# Patient Record
Sex: FEMALE | Race: WHITE | NOT HISPANIC OR LATINO | Employment: FULL TIME | URBAN - METROPOLITAN AREA
[De-identification: names, ages, dates, MRNs, and addresses within clinical notes are randomized per-mention and may not be internally consistent; named-entity substitution may affect disease eponyms.]

---

## 2021-07-23 ENCOUNTER — LAB REQUISITION (OUTPATIENT)
Dept: LAB | Facility: CLINIC | Age: 46
End: 2021-07-23
Payer: COMMERCIAL

## 2021-07-23 DIAGNOSIS — Z11.59 ENCOUNTER FOR SCREENING FOR OTHER VIRAL DISEASES: ICD-10-CM

## 2021-07-23 LAB — SARS-COV-2 RNA RESP QL NAA+PROBE: NEGATIVE

## 2021-07-23 PROCEDURE — U0005 INFEC AGEN DETEC AMPLI PROBE: HCPCS | Mod: ORL | Performed by: PHYSICIAN ASSISTANT

## 2021-08-16 ENCOUNTER — LAB REQUISITION (OUTPATIENT)
Dept: LAB | Facility: CLINIC | Age: 46
End: 2021-08-16
Payer: COMMERCIAL

## 2021-08-16 DIAGNOSIS — Z11.59 ENCOUNTER FOR SCREENING FOR OTHER VIRAL DISEASES: ICD-10-CM

## 2021-08-16 PROCEDURE — U0003 INFECTIOUS AGENT DETECTION BY NUCLEIC ACID (DNA OR RNA); SEVERE ACUTE RESPIRATORY SYNDROME CORONAVIRUS 2 (SARS-COV-2) (CORONAVIRUS DISEASE [COVID-19]), AMPLIFIED PROBE TECHNIQUE, MAKING USE OF HIGH THROUGHPUT TECHNOLOGIES AS DESCRIBED BY CMS-2020-01-R: HCPCS | Mod: ORL | Performed by: STUDENT IN AN ORGANIZED HEALTH CARE EDUCATION/TRAINING PROGRAM

## 2021-08-17 LAB — SARS-COV-2 RNA RESP QL NAA+PROBE: NEGATIVE

## 2024-04-01 RX ORDER — ALBUTEROL SULFATE 90 UG/1
2 AEROSOL, METERED RESPIRATORY (INHALATION) EVERY 4 HOURS PRN
COMMUNITY
Start: 2023-10-25

## 2024-04-01 RX ORDER — NAPROXEN 375 MG/1
375 TABLET, DELAYED RELEASE ORAL 2 TIMES DAILY PRN
COMMUNITY

## 2024-04-01 RX ORDER — BLOOD SUGAR DIAGNOSTIC
1 STRIP MISCELLANEOUS DAILY
COMMUNITY
Start: 2023-07-10

## 2024-04-01 RX ORDER — GABAPENTIN 300 MG/1
300 CAPSULE ORAL 2 TIMES DAILY
COMMUNITY
Start: 2024-03-18

## 2024-04-01 RX ORDER — ALBUTEROL SULFATE 0.83 MG/ML
2.5 SOLUTION RESPIRATORY (INHALATION) EVERY 6 HOURS PRN
COMMUNITY
Start: 2024-01-09

## 2024-04-01 RX ORDER — VENLAFAXINE HYDROCHLORIDE 150 MG/1
150 CAPSULE, EXTENDED RELEASE ORAL DAILY
COMMUNITY
Start: 2023-06-06

## 2024-04-01 RX ORDER — ROPINIROLE 1 MG/1
1 TABLET, FILM COATED ORAL 3 TIMES DAILY
COMMUNITY
Start: 2024-03-18

## 2024-04-01 RX ORDER — IBUPROFEN 200 MG
200 TABLET ORAL EVERY 6 HOURS PRN
COMMUNITY

## 2024-04-01 RX ORDER — ACETAMINOPHEN 325 MG/1
325 TABLET ORAL EVERY 6 HOURS PRN
COMMUNITY

## 2024-04-01 RX ORDER — ATORVASTATIN CALCIUM 20 MG/1
20 TABLET, FILM COATED ORAL DAILY
COMMUNITY
Start: 2024-03-18

## 2024-04-03 ENCOUNTER — ANESTHESIA EVENT (OUTPATIENT)
Dept: SURGERY | Facility: CLINIC | Age: 49
End: 2024-04-03
Payer: COMMERCIAL

## 2024-04-04 ENCOUNTER — ANESTHESIA (OUTPATIENT)
Dept: SURGERY | Facility: CLINIC | Age: 49
End: 2024-04-04
Payer: COMMERCIAL

## 2024-04-04 ENCOUNTER — HOSPITAL ENCOUNTER (OUTPATIENT)
Facility: CLINIC | Age: 49
Discharge: HOME | End: 2024-04-04
Attending: ORTHOPAEDIC SURGERY | Admitting: ORTHOPAEDIC SURGERY
Payer: COMMERCIAL

## 2024-04-04 VITALS
DIASTOLIC BLOOD PRESSURE: 65 MMHG | TEMPERATURE: 97.3 F | OXYGEN SATURATION: 94 % | SYSTOLIC BLOOD PRESSURE: 143 MMHG | WEIGHT: 291.3 LBS | RESPIRATION RATE: 12 BRPM | HEART RATE: 77 BPM | HEIGHT: 65 IN | BODY MASS INDEX: 48.53 KG/M2

## 2024-04-04 DIAGNOSIS — G89.18 POSTOPERATIVE PAIN OF KNEE: Primary | ICD-10-CM

## 2024-04-04 DIAGNOSIS — M25.569 POSTOPERATIVE PAIN OF KNEE: Primary | ICD-10-CM

## 2024-04-04 PROCEDURE — 360N000076 HC SURGERY LEVEL 3, PER MIN: Performed by: ORTHOPAEDIC SURGERY

## 2024-04-04 PROCEDURE — 250N000025 HC SEVOFLURANE, PER MIN: Performed by: ORTHOPAEDIC SURGERY

## 2024-04-04 PROCEDURE — 258N000001 HC RX 258: Performed by: ORTHOPAEDIC SURGERY

## 2024-04-04 PROCEDURE — 250N000009 HC RX 250: Performed by: NURSE ANESTHETIST, CERTIFIED REGISTERED

## 2024-04-04 PROCEDURE — 370N000017 HC ANESTHESIA TECHNICAL FEE, PER MIN: Performed by: ORTHOPAEDIC SURGERY

## 2024-04-04 PROCEDURE — 999N000141 HC STATISTIC PRE-PROCEDURE NURSING ASSESSMENT: Performed by: ORTHOPAEDIC SURGERY

## 2024-04-04 PROCEDURE — 250N000013 HC RX MED GY IP 250 OP 250 PS 637: Performed by: ANESTHESIOLOGY

## 2024-04-04 PROCEDURE — 250N000011 HC RX IP 250 OP 636: Performed by: PHYSICIAN ASSISTANT

## 2024-04-04 PROCEDURE — 258N000003 HC RX IP 258 OP 636: Performed by: STUDENT IN AN ORGANIZED HEALTH CARE EDUCATION/TRAINING PROGRAM

## 2024-04-04 PROCEDURE — 710N000012 HC RECOVERY PHASE 2, PER MINUTE: Performed by: ORTHOPAEDIC SURGERY

## 2024-04-04 PROCEDURE — 710N000010 HC RECOVERY PHASE 1, LEVEL 2, PER MIN: Performed by: ORTHOPAEDIC SURGERY

## 2024-04-04 PROCEDURE — 250N000011 HC RX IP 250 OP 636: Performed by: ANESTHESIOLOGY

## 2024-04-04 PROCEDURE — 272N000001 HC OR GENERAL SUPPLY STERILE: Performed by: ORTHOPAEDIC SURGERY

## 2024-04-04 PROCEDURE — 82962 GLUCOSE BLOOD TEST: CPT

## 2024-04-04 PROCEDURE — 250N000011 HC RX IP 250 OP 636: Performed by: NURSE ANESTHETIST, CERTIFIED REGISTERED

## 2024-04-04 RX ORDER — SODIUM CHLORIDE, SODIUM LACTATE, POTASSIUM CHLORIDE, CALCIUM CHLORIDE 600; 310; 30; 20 MG/100ML; MG/100ML; MG/100ML; MG/100ML
INJECTION, SOLUTION INTRAVENOUS CONTINUOUS
Status: DISCONTINUED | OUTPATIENT
Start: 2024-04-04 | End: 2024-04-04 | Stop reason: HOSPADM

## 2024-04-04 RX ORDER — ACETAMINOPHEN 325 MG/1
650 TABLET ORAL
Status: DISCONTINUED | OUTPATIENT
Start: 2024-04-04 | End: 2024-04-04 | Stop reason: HOSPADM

## 2024-04-04 RX ORDER — OXYCODONE HYDROCHLORIDE 5 MG/1
5 TABLET ORAL
Status: DISCONTINUED | OUTPATIENT
Start: 2024-04-04 | End: 2024-04-04 | Stop reason: HOSPADM

## 2024-04-04 RX ORDER — CEFAZOLIN SODIUM/WATER 3 G/30 ML
3 SYRINGE (ML) INTRAVENOUS
Status: COMPLETED | OUTPATIENT
Start: 2024-04-04 | End: 2024-04-04

## 2024-04-04 RX ORDER — LIDOCAINE HYDROCHLORIDE 10 MG/ML
INJECTION, SOLUTION INFILTRATION; PERINEURAL PRN
Status: DISCONTINUED | OUTPATIENT
Start: 2024-04-04 | End: 2024-04-04

## 2024-04-04 RX ORDER — DEXAMETHASONE SODIUM PHOSPHATE 10 MG/ML
INJECTION, SOLUTION INTRAMUSCULAR; INTRAVENOUS PRN
Status: DISCONTINUED | OUTPATIENT
Start: 2024-04-04 | End: 2024-04-04

## 2024-04-04 RX ORDER — FENTANYL CITRATE 50 UG/ML
50 INJECTION, SOLUTION INTRAMUSCULAR; INTRAVENOUS EVERY 5 MIN PRN
Status: DISCONTINUED | OUTPATIENT
Start: 2024-04-04 | End: 2024-04-04 | Stop reason: HOSPADM

## 2024-04-04 RX ORDER — BUPIVACAINE HYDROCHLORIDE 5 MG/ML
INJECTION, SOLUTION EPIDURAL; INTRACAUDAL
Status: COMPLETED | OUTPATIENT
Start: 2024-04-04 | End: 2024-04-04

## 2024-04-04 RX ORDER — NALOXONE HYDROCHLORIDE 0.4 MG/ML
0.1 INJECTION, SOLUTION INTRAMUSCULAR; INTRAVENOUS; SUBCUTANEOUS
Status: DISCONTINUED | OUTPATIENT
Start: 2024-04-04 | End: 2024-04-04 | Stop reason: HOSPADM

## 2024-04-04 RX ORDER — HYDROMORPHONE HCL IN WATER/PF 6 MG/30 ML
0.2 PATIENT CONTROLLED ANALGESIA SYRINGE INTRAVENOUS EVERY 5 MIN PRN
Status: DISCONTINUED | OUTPATIENT
Start: 2024-04-04 | End: 2024-04-04 | Stop reason: HOSPADM

## 2024-04-04 RX ORDER — IPRATROPIUM BROMIDE AND ALBUTEROL SULFATE 2.5; .5 MG/3ML; MG/3ML
3 SOLUTION RESPIRATORY (INHALATION) ONCE
Status: DISCONTINUED | OUTPATIENT
Start: 2024-04-04 | End: 2024-04-04 | Stop reason: HOSPADM

## 2024-04-04 RX ORDER — HYDROMORPHONE HCL IN WATER/PF 6 MG/30 ML
0.4 PATIENT CONTROLLED ANALGESIA SYRINGE INTRAVENOUS EVERY 5 MIN PRN
Status: DISCONTINUED | OUTPATIENT
Start: 2024-04-04 | End: 2024-04-04 | Stop reason: HOSPADM

## 2024-04-04 RX ORDER — FENTANYL CITRATE 50 UG/ML
INJECTION, SOLUTION INTRAMUSCULAR; INTRAVENOUS PRN
Status: DISCONTINUED | OUTPATIENT
Start: 2024-04-04 | End: 2024-04-04

## 2024-04-04 RX ORDER — FENTANYL CITRATE 50 UG/ML
25 INJECTION, SOLUTION INTRAMUSCULAR; INTRAVENOUS EVERY 5 MIN PRN
Status: DISCONTINUED | OUTPATIENT
Start: 2024-04-04 | End: 2024-04-04 | Stop reason: HOSPADM

## 2024-04-04 RX ORDER — FENTANYL CITRATE 50 UG/ML
50 INJECTION, SOLUTION INTRAMUSCULAR; INTRAVENOUS
Status: DISCONTINUED | OUTPATIENT
Start: 2024-04-04 | End: 2024-04-04 | Stop reason: HOSPADM

## 2024-04-04 RX ORDER — ONDANSETRON 2 MG/ML
4 INJECTION INTRAMUSCULAR; INTRAVENOUS EVERY 30 MIN PRN
Status: DISCONTINUED | OUTPATIENT
Start: 2024-04-04 | End: 2024-04-04 | Stop reason: HOSPADM

## 2024-04-04 RX ORDER — ONDANSETRON 4 MG/1
4 TABLET, ORALLY DISINTEGRATING ORAL EVERY 30 MIN PRN
Status: DISCONTINUED | OUTPATIENT
Start: 2024-04-04 | End: 2024-04-04 | Stop reason: HOSPADM

## 2024-04-04 RX ORDER — LIDOCAINE 40 MG/G
CREAM TOPICAL
Status: DISCONTINUED | OUTPATIENT
Start: 2024-04-04 | End: 2024-04-04 | Stop reason: HOSPADM

## 2024-04-04 RX ORDER — ONDANSETRON 2 MG/ML
INJECTION INTRAMUSCULAR; INTRAVENOUS PRN
Status: DISCONTINUED | OUTPATIENT
Start: 2024-04-04 | End: 2024-04-04

## 2024-04-04 RX ORDER — OXYCODONE HYDROCHLORIDE 5 MG/1
10 TABLET ORAL
Status: COMPLETED | OUTPATIENT
Start: 2024-04-04 | End: 2024-04-04

## 2024-04-04 RX ORDER — HYDROCODONE BITARTRATE AND ACETAMINOPHEN 5; 325 MG/1; MG/1
1-2 TABLET ORAL EVERY 4 HOURS PRN
Qty: 15 TABLET | Refills: 0 | Status: SHIPPED | OUTPATIENT
Start: 2024-04-04

## 2024-04-04 RX ORDER — CEFAZOLIN SODIUM/WATER 3 G/30 ML
3 SYRINGE (ML) INTRAVENOUS SEE ADMIN INSTRUCTIONS
Status: DISCONTINUED | OUTPATIENT
Start: 2024-04-04 | End: 2024-04-04 | Stop reason: HOSPADM

## 2024-04-04 RX ORDER — PROPOFOL 10 MG/ML
INJECTION, EMULSION INTRAVENOUS PRN
Status: DISCONTINUED | OUTPATIENT
Start: 2024-04-04 | End: 2024-04-04

## 2024-04-04 RX ORDER — HYDROCODONE BITARTRATE AND ACETAMINOPHEN 5; 325 MG/1; MG/1
2 TABLET ORAL
Status: DISCONTINUED | OUTPATIENT
Start: 2024-04-04 | End: 2024-04-04 | Stop reason: HOSPADM

## 2024-04-04 RX ORDER — FENTANYL CITRATE 50 UG/ML
100 INJECTION, SOLUTION INTRAMUSCULAR; INTRAVENOUS
Status: DISCONTINUED | OUTPATIENT
Start: 2024-04-04 | End: 2024-04-04 | Stop reason: HOSPADM

## 2024-04-04 RX ADMIN — FENTANYL CITRATE 25 MCG: 50 INJECTION, SOLUTION INTRAMUSCULAR; INTRAVENOUS at 13:36

## 2024-04-04 RX ADMIN — FENTANYL CITRATE 100 MCG: 50 INJECTION, SOLUTION INTRAMUSCULAR; INTRAVENOUS at 11:25

## 2024-04-04 RX ADMIN — FENTANYL CITRATE 25 MCG: 50 INJECTION, SOLUTION INTRAMUSCULAR; INTRAVENOUS at 13:41

## 2024-04-04 RX ADMIN — BUPIVACAINE HYDROCHLORIDE 15 ML: 5 INJECTION, SOLUTION EPIDURAL; INTRACAUDAL; PERINEURAL at 11:28

## 2024-04-04 RX ADMIN — Medication 3 G: at 12:27

## 2024-04-04 RX ADMIN — MIDAZOLAM HYDROCHLORIDE 2 MG: 1 INJECTION, SOLUTION INTRAMUSCULAR; INTRAVENOUS at 11:24

## 2024-04-04 RX ADMIN — HYDROMORPHONE HYDROCHLORIDE 0.5 MG: 1 INJECTION, SOLUTION INTRAMUSCULAR; INTRAVENOUS; SUBCUTANEOUS at 13:03

## 2024-04-04 RX ADMIN — HYDROMORPHONE HYDROCHLORIDE 0.5 MG: 1 INJECTION, SOLUTION INTRAMUSCULAR; INTRAVENOUS; SUBCUTANEOUS at 12:58

## 2024-04-04 RX ADMIN — OXYCODONE HYDROCHLORIDE 10 MG: 5 TABLET ORAL at 14:18

## 2024-04-04 RX ADMIN — DEXAMETHASONE SODIUM PHOSPHATE 10 MG: 10 INJECTION, SOLUTION INTRAMUSCULAR; INTRAVENOUS at 12:35

## 2024-04-04 RX ADMIN — FENTANYL CITRATE 100 MCG: 50 INJECTION INTRAMUSCULAR; INTRAVENOUS at 12:35

## 2024-04-04 RX ADMIN — ONDANSETRON 4 MG: 2 INJECTION INTRAMUSCULAR; INTRAVENOUS at 13:03

## 2024-04-04 RX ADMIN — SODIUM CHLORIDE, POTASSIUM CHLORIDE, SODIUM LACTATE AND CALCIUM CHLORIDE: 600; 310; 30; 20 INJECTION, SOLUTION INTRAVENOUS at 10:35

## 2024-04-04 RX ADMIN — LIDOCAINE HYDROCHLORIDE 5 ML: 10 INJECTION, SOLUTION INFILTRATION; PERINEURAL at 12:35

## 2024-04-04 RX ADMIN — SUGAMMADEX 200 MG: 100 INJECTION, SOLUTION INTRAVENOUS at 13:03

## 2024-04-04 RX ADMIN — FENTANYL CITRATE 50 MCG: 50 INJECTION, SOLUTION INTRAMUSCULAR; INTRAVENOUS at 13:31

## 2024-04-04 RX ADMIN — ROCURONIUM BROMIDE 50 MG: 10 INJECTION, SOLUTION INTRAVENOUS at 12:35

## 2024-04-04 RX ADMIN — PROPOFOL 200 MG: 10 INJECTION, EMULSION INTRAVENOUS at 12:35

## 2024-04-04 ASSESSMENT — ACTIVITIES OF DAILY LIVING (ADL)
ADLS_ACUITY_SCORE: 20
ADLS_ACUITY_SCORE: 18
ADLS_ACUITY_SCORE: 20
ADLS_ACUITY_SCORE: 20

## 2024-04-04 NOTE — PHARMACY-ADMISSION MEDICATION HISTORY
Pharmacist Admission Medication History    Admission medication history is complete. The information provided in this note is only as accurate as the sources available at the time of the update.    Information Source(s): Patient via in-person    Pertinent Information: n/a    Changes made to PTA medication list:  Added: None  Deleted: None  Changed: gabapentin dose, ropinirole dose     Allergies reviewed with patient and updates made in EHR: yes    Medication History Completed By: Suzette Lazar [facility] 4/4/2024 10:36 AM    PTA Med List   Medication Sig Note Last Dose    acetaminophen (TYLENOL) 325 MG tablet Take 325 mg by mouth every 6 hours as needed for mild pain  Past Week    albuterol (PROAIR HFA/PROVENTIL HFA/VENTOLIN HFA) 108 (90 Base) MCG/ACT inhaler Inhale 2 puffs into the lungs every 4 hours as needed for shortness of breath  4/4/2024 at am    albuterol (PROVENTIL) (2.5 MG/3ML) 0.083% neb solution Inhale 2.5 mg into the lungs every 6 hours as needed for wheezing  Unknown    atorvastatin (LIPITOR) 20 MG tablet Take 20 mg by mouth daily  4/3/2024    blood glucose (ACCU-CHEK EDILBERTO PLUS) test strip 1 each by Other route daily      gabapentin (NEURONTIN) 300 MG capsule Take 300 mg by mouth 2 times daily 300 mg in the morning, 900 mg in the evening  4/3/2024 at hs    ibuprofen (ADVIL/MOTRIN) 200 MG tablet Take 200 mg by mouth every 6 hours as needed for mild pain  Past Week    melatonin 5 MG tablet Take 5 mg by mouth nightly as needed for sleep  Unknown    naproxen (EC-NAPROSYN) 375 MG TBEC Take 375 mg by mouth 2 times daily as needed for moderate pain  Past Week    rOPINIRole (REQUIP) 1 MG tablet Take 1 mg by mouth 3 times daily 1 mg in morning, 1 mg in afternoon, 5 mg in the evening  4/3/2024 at pm    Semaglutide, 2 MG/DOSE, (OZEMPIC) 8 MG/3ML pen Inject 2 mg Subcutaneous every 7 days 4/1/2024: LD 3/16 3/16/2024    venlafaxine (EFFEXOR XR) 150 MG 24 hr capsule Take 150 mg by mouth daily  4/3/2024 at am

## 2024-04-04 NOTE — ANESTHESIA PROCEDURE NOTES
Airway       Patient location during procedure: OR       Procedure Start/Stop Times: 4/4/2024 12:36 PM  Staff -        Anesthesiologist:  Nam Bunch MD       CRNA: Norma Del Valle APRN CRNA       Performed By: CRNA  Consent for Airway        Urgency: elective  Indications and Patient Condition       Indications for airway management: ann-procedural       Induction type:intravenous       Mask difficulty assessment: 1 - vent by mask    Final Airway Details       Final airway type: endotracheal airway       Successful airway: ETT - single  Endotracheal Airway Details        ETT size (mm): 7.5       Cuffed: yes       Successful intubation technique: direct laryngoscopy       DL Blade Type: Kang 2       Grade View of Cords: 1       Adjucts: stylet       Measured from: lips       Secured at (cm): 21       Bite block used: None    Post intubation assessment        Placement verified by: capnometry and equal breath sounds        Number of attempts at approach: 1       Number of other approaches attempted: 0       Secured with: tape       Ease of procedure: easy       Dentition: Intact and Unchanged       Dental guard used and removed. Dental Guard Type: Standard White.    Medication(s) Administered   Medication Administration Time: 4/4/2024 12:36 PM

## 2024-04-04 NOTE — BRIEF OP NOTE
[facility]    Brief Operative Note    Pre-operative diagnosis: Osteoarthritis of left knee [M17.12]  Post-operative diagnosis Same as pre-operative diagnosis    Procedure: LEFT KNEE ARTHROSCOPY,, Left - Knee  CHONDROPLASTY, Left - Knee    Surgeon: Surgeon(s) and Role:     * Oliverio Das MD - Primary  Anesthesia: General with Block   Estimated Blood Loss: Minimal    Drains: None  Specimens: * No specimens in log *  Findings:   None.  Complications: None.  Implants: * No implants in log *

## 2024-04-04 NOTE — ANESTHESIA POSTPROCEDURE EVALUATION
Patient: Wily Bowers    Procedure: Procedure(s):  LEFT KNEE ARTHROSCOPY,  CHONDROPLASTY       Anesthesia Type:  General    Note:     Postop Pain Control: Uneventful            Sign Out: Well controlled pain   PONV: No   Neuro/Psych: Uneventful            Sign Out: Acceptable/Baseline neuro status   Airway/Respiratory: Uneventful            Sign Out: Acceptable/Baseline resp. status   CV/Hemodynamics: Uneventful            Sign Out: Acceptable CV status; No obvious hypovolemia; No obvious fluid overload   Other NRE: NONE   DID A NON-ROUTINE EVENT OCCUR? No           Last vitals:  Vitals Value Taken Time   /60 04/04/24 1346   Temp 36.6  C (97.8  F) 04/04/24 1346   Pulse 79 04/04/24 1348   Resp 20 04/04/24 1348   SpO2 93 % 04/04/24 1348   Vitals shown include unfiled device data.    Electronically Signed By: Nam Bunch MD

## 2024-04-04 NOTE — ANESTHESIA PROCEDURE NOTES
Adductor canal Procedure Note    Pre-Procedure   Staff -        Anesthesiologist:  Nam Bunch MD       Performed By: anesthesiologist       Location: pre-op       Procedure Start/Stop Times: 4/4/2024 11:28 AM and 4/4/2024 11:31 AM       Pre-Anesthestic Checklist: patient identified, IV checked, site marked, risks and benefits discussed, informed consent, monitors and equipment checked, pre-op evaluation, at physician/surgeon's request and post-op pain management  Timeout:       Correct Patient: Yes        Correct Procedure: Yes        Correct Site: Yes        Correct Position: Yes        Correct Laterality: Yes        Site Marked: Yes  Procedure Documentation  Procedure: Adductor canal       Laterality: left       Patient Position: supine       Patient Prep/Sterile Barriers: sterile gloves, mask       Skin prep: Chloraprep       Needle Type: short bevel       Needle Gauge: 20.        Needle Length (Inches): 4        Ultrasound guided       1. Ultrasound was used to identify targeted nerve, plexus, vascular marker, or fascial plane and place a needle adjacent to it in real-time.       2. Ultrasound was used to visualize the spread of anesthetic in close proximity to the above referenced structure.       3. A permanent image is entered into the patient's record.       4. The visualized anatomic structures appeared normal.       5. There were no apparent abnormal pathologic findings.    Assessment/Narrative         The placement was negative for: blood aspirated, painful injection and site bleeding       Paresthesias: No.       Bolus given via needle..        Secured via.        Insertion/Infusion Method: Single Shot       Complications: none       Injection made incrementally with aspirations every 5 mL.    Medication(s) Administered   Bupivacaine 0.5% PF (Infiltration) - Infiltration   15 mL - 4/4/2024 11:28:00 AM  Medication Administration Time: 4/4/2024 11:28 AM      FOR [facility] ([facility] - [place], [place]) ONLY:    "Pain Team Contact information: please page the Pain Team Via [facility]. Search \"Pain\". During daytime hours, please page the attending first. At night please page the resident first.      "

## 2024-04-04 NOTE — OP NOTE
DATE OF SURGERY: April 4, 2024    PRE-OPERATIVE DIAGNOSIS: Left knee chondromalacia patellofemoral joint, medial femoral condyle    POST-OPERATIVE DIAGNOSIS:  1.  Left knee grade III/IV chondromalacia patella, medial aspect trochlear groove  2.  Left knee high-grade focal full-thickness chondral defect involving posterior weightbearing aspect of medial femoral condyle measuring 12 x 4 mm with surrounding diffuse grade II chondromalacia and early delamination    PROCEDURE:  1.  Left knee examination under anesthesia  2.  Left knee arthroscopic chondroplasty patella, trochlear groove and medial femoral condyle    ATTENDING: Oliverio Das MD    ASSISTANT: Tristan Renee PA-C.  A skilled physicians assistant was required for patient positioning and safety.  This could not have been performed by a surgical technologist alone.    ANESTHESIA: General with adductor canal block    ESTIMATED BLOOD LOSS: 2 mL    TOURNIQUET TIME: None    COMPLICATIONS: None    SPECIMENS: None    INDICATIONS FOR PROCEDURE: Rhys is a 49-year-old female with continued left knee pain.  She has exhausted nonsurgical treatment including injections and activity modification.  MRI did not demonstrate any ligamentous or meniscal injury but rather focal longitudinal chondral defect involving the medial femoral condyle with more diffuse chondromalacia involving the patellofemoral joint.  We discussed the options moving forward including nonsurgical and surgical treatment and a shared decision was made to proceed with arthroscopy including chondroplasty with possibility for microfracture depending on intraoperative findings.  She understands the differences in treatment and rehabilitation protocol between these surgeries.  Use of aspirin to minimize risk of blood clot was reviewed.  She would like to proceed.  Informed consent was obtained.    The patient was identified in the preoperative holding area.  The correct operative site, the left knee, was  marked with indelible ink.  An adductor canal block was successfully placed by the anesthesiologist.  Infusion of IV antibiotics was confirmed, she was then brought to the operating room placed supine on the table.  General endotracheal anesthesia was induced without complication.    Examination under anesthesia showed 10 degrees of hyperextension, 120 degrees of flexion.  She had negative Lachman, posterior drawer and was stable to varus and valgus stress at 30 degrees.  Crepitations with range of motion.  The left lower extremity was then prepped and draped in usual sterile fashion.    Prior to the procedure, a time out was performed to identify the correct patient, operative site, and any implants needed.    Standard anterolateral and anteromedial arthroscopic portals were made with a #11 blade.  Arthroscope was placed into the knee and it was insufflated with fluid.  Diagnostic arthroscopy showed no loose bodies in the suprapatellar pouch.  There was grade IV chondromalacia involving the lateral aspect on the undersurface of the patella up through the median ridge, the medial cartilage was relatively well-preserved.  Within the trochlear groove the lateral cartilage was well-preserved but down the central and medial portions of the trochlear groove into the upper portion of the medial femoral condyle there were deep fissuring and grade 3/4 changes.  ACL and PCL intact.  Lateral compartment was pristine including cartilage surfaces, popliteus and meniscus.  Medially, tibial plateau showed grade 1/2 changes, the medial meniscus was perfectly intact and there was a focal defect as described above involving the weightbearing portion of the medial femoral condyle with surrounding early delamination and grade 2 changes.    I did not feel that a microfracture was amendable given the intraoperative findings and I think overall this would have relatively poor prognosis.  Therefore proceeded with chondroplasty, both in  the patellofemoral joint and the medial femoral condyle.  Areas of loose, unstable cartilage about back to smooth, stable defects.  Debris was cleared with mechanical shaver.  No new full-thickness defects were identified.  The arthroscope was removed and it was drained of fluid.    Portal sites were closed interrupted nylon sutures.  A sterile dressing was placed, she was then extubated, awakened and taken to the PACU in stable condition.  All sponge and needle counts were correct.  Is present for all portions of the procedure.  There were no apparent complications.    POST-OPERATIVE PLAN:    1.  Weightbearing as tolerated left lower extremity  2.  Discharge from hospital today with Vicodin for pain control  3.  Physical therapy to work on core, quadriceps and gluteal strengthening  4.  Return to clinic in 2 weeks for clinical check

## 2024-04-04 NOTE — ANESTHESIA PREPROCEDURE EVALUATION
"Anesthesia Pre-Procedure Evaluation    Patient: Wily Bowers   MRN: [id number] : 1975        Procedure : Procedure(s):  LEFT KNEE ARTHROSCOPY,  CHONDROPLASTY, POSSIBLE MICROFRACTURE          Past Medical History:   Diagnosis Date    Diabetes (H)     Sleep apnea     Uncomplicated asthma       History reviewed. No pertinent surgical history.   Allergies   Allergen Reactions    Erythromycin Hives    Neomycin Swelling and Hives     Ears    swelled shut.      Social History     Tobacco Use    Smoking status: Every Day     Packs/day: .75     Types: Cigarettes    Smokeless tobacco: Never   Substance Use Topics    Alcohol use: Not Currently      Wt Readings from Last 1 Encounters:   24 132.1 kg (291 lb 4.8 oz)        Anesthesia Evaluation   Pt has had prior anesthetic.     No history of anesthetic complications       ROS/MED HX  ENT/Pulmonary:     (+) sleep apnea,                     asthma                  Neurologic:  - neg neurologic ROS     Cardiovascular:  - neg cardiovascular ROS     METS/Exercise Tolerance:     Hematologic:       Musculoskeletal:       GI/Hepatic: Comment: Ozempic last dose ~2 wks ago, denies reflux/nausea      Renal/Genitourinary:       Endo:     (+)  type II DM,             Obesity (morbid),       Psychiatric/Substance Use:       Infectious Disease:       Malignancy:       Other:            Physical Exam    Airway        Mallampati: II   TM distance: > 3 FB   Neck ROM: full   Mouth opening: > 3 cm    Respiratory Devices and Support         Dental       (+) Minor Abnormalities - some fillings, tiny chips      Cardiovascular          Rhythm and rate: regular and normal     Pulmonary           breath sounds clear to auscultation           OUTSIDE LABS:  CBC: No results found for: \"WBC\", \"HGB\", \"HCT\", \"PLT\"  BMP: No results found for: \"NA\", \"POTASSIUM\", \"CHLORIDE\", \"CO2\", \"BUN\", \"CR\", \"GLC\"  COAGS: No results found for: \"PTT\", \"INR\", \"FIBR\"  POC: No results found for: \"BGM\", \"HCG\", " "\"HCGS\"  HEPATIC: No results found for: \"ALBUMIN\", \"PROTTOTAL\", \"ALT\", \"AST\", \"GGT\", \"ALKPHOS\", \"BILITOTAL\", \"BILIDIRECT\", \"CRISTINA\"  OTHER: No results found for: \"PH\", \"LACT\", \"A1C\", \"GAYLE\", \"PHOS\", \"MAG\", \"LIPASE\", \"AMYLASE\", \"TSH\", \"T4\", \"T3\", \"CRP\", \"SED\"    Anesthesia Plan    ASA Status:  3    NPO Status:  NPO Appropriate    Anesthesia Type: General.     - Airway: ETT   Induction: Intravenous.      Techniques and Equipment:     - Airway: Video-Laryngoscope       Consents    Anesthesia Plan(s) and associated risks, benefits, and realistic alternatives discussed. Questions answered and patient/representative(s) expressed understanding.     - Discussed: Risks, Benefits and Alternatives for the PROCEDURE were discussed     - Discussed with:  Patient            Postoperative Care    Pain management: IV analgesics, Oral pain medications, Peripheral nerve block (Single Shot).   PONV prophylaxis: Ondansetron (or other 5HT-3), Dexamethasone or Solumedrol     Comments:               Nam Bunch MD    I have reviewed the pertinent notes and labs in the chart from the past 30 days and (re)examined the patient.  Any updates or changes from those notes are reflected in this note.              # Severe Obesity: Estimated body mass index is 48.47 kg/m  as calculated from the following:    Height as of this encounter: 1.651 m (5' 5\").    Weight as of this encounter: 132.1 kg (291 lb 4.8 oz).      "

## 2024-04-04 NOTE — DISCHARGE INSTRUCTIONS
After Anesthesia (Sleep Medicine)  What should I do after anesthesia?  You should rest and relax for the next 24 hours. Avoid risky or difficult (strenuous) activity. A responsible adult should stay with you overnight.  Don't drive or use any heavy equipment for 24 hours. Even if you feel normal, your reactions may be affected by the sleep medicine given to you.  Don't drink alcohol or make any important decisions for 24 hours.  Slowly get back to your regular diet, as you feel able.  How should I expect to feel?  It's normal to feel dizzy, light-headed, or faint for up to a full day after anesthesia or while taking pain medicine. If this happens:   Sit down for a few minutes before standing.  Have someone help you when you get up to walk or use the bathroom.  If you have nausea (feel sick to your stomach) or vomit (throw up):   Drink clear liquids (such as apple juice, ginger ale, broth, or 7UP) until you feel better.  If you feel sick to your stomach, or you keep vomiting for 24 hours, please call the doctor.  What else should I know?  You might have a dry mouth, sore throat, muscle aches, or trouble sleeping. These should go away after 24 hours.  Please contact your doctor if you have any other symptoms that concern you, such as fever, pain, bleeding, fluid drainage, swelling, or headache, or if it's been over 8 to 10 hours and you still aren't able to pee (urinate).  If you have a history of sleep apnea, it's very important to use your CPAP machine for the next 24 hours when you nap or sleep.   For informational purposes only. Not to replace the advice of your health care provider. Copyright   2023 [employer]. All rights reserved. Clinically reviewed by Gino Greer MD. [employer] [id number] - REV 09/23.  Peripheral Nerve Blocks: Care Instructions  Overview    A peripheral nerve block is a way to block pain from a part of the body, such as an arm or a leg. It's used for procedures that are done on  one specific area of the body. It can also help relieve pain after a procedure. This can reduce the need for other pain medicines. Sometimes a nerve block may be used to relieve pain from an injury, such as a rib fracture.  Nerve blocks are done by injecting numbing medicine into the area near a nerve or group of nerves. There are different types of peripheral nerve blocks. The type that is used depends on the area of the body that needs to be numbed.  Problems after a nerve block are rare. There's a small risk of nerve damage, infection, or bleeding. Rarely, the medicines used can affect the heart.  Follow-up care is a key part of your treatment and safety. Be sure to make and go to all appointments, and call your doctor if you are having problems. It's also a good idea to know your test results and keep a list of the medicines you take.  How can you care for yourself at home?  Follow all instructions from your doctor about how to take care of the area that was numbed.  Be careful not to injure the area while it's still numb.  If you move the area, move it slowly and carefully.  Be careful with hot and cold. Since you won't feel pain, it's easier for damage from heat or cold to happen.  If your doctor leaves a small tube in place to help you stay numb after your procedure, follow your doctor's instructions about how to use it and take care of it.  If you had sedation or general anesthesia  Don't sign legal documents or do anything that requires attention to detail until you recover. It takes time for the medicine's effects to completely wear off.  Don't drive or operate any machinery for at least 24 hours. Wait until the medicine wears off and you can think clearly and react easily.  If you have sleep apnea and you have a CPAP machine, be sure to use it.  After the procedure, make sure to rest. Some people will feel drowsy or dizzy for up to a few hours after waking up.  Take your time, and move slowly. Sudden  "changes in position may cause nausea.  Don't drink alcohol for 24 hours.  You can eat your normal diet, unless your doctor gives you other instructions. If your stomach is upset, try clear liquids and bland, low-fat foods like plain toast or rice.  Drink plenty of fluids (unless your doctor tells you not to).  When should you call for help?   Call 911 anytime you think you may need emergency care. For example, call if:    You have trouble breathing.     You passed out (lost consciousness).   Call your doctor now or seek immediate medical care if:    You have nausea or vomiting that gets worse or won't stop.     You have a fever.     The medicine is not wearing off by the time the doctor said it should.     You have injured the numb area of your body.   Watch closely for changes in your health, and be sure to contact your doctor if:    You do not get better as expected.   Where can you learn more?  Go to https://www.[email or web address].net/patiented  Enter A627 in the search box to learn more about \"Peripheral Nerve Blocks: Care Instructions.\"  Current as of: June 24, 2023               Content Version: 14.0    4154-1431 [employer].   Care instructions adapted under license by your healthcare professional. If you have questions about a medical condition or this instruction, always ask your healthcare professional. [employer] disclaims any warranty or liability for your use of this information.      "

## 2024-04-04 NOTE — INTERVAL H&P NOTE
"I have reviewed the surgical (or preoperative) H&P that is linked to this encounter, and examined the patient. There are no significant changes    Clinical Conditions Present on Arrival:  Clinically Significant Risk Factors Present on Admission                  # Severe Obesity: Estimated body mass index is 48.47 kg/m  as calculated from the following:    Height as of this encounter: 1.651 m (5' 5\").    Weight as of this encounter: 132.1 kg (291 lb 4.8 oz).       "

## 2024-04-04 NOTE — ANESTHESIA CARE TRANSFER NOTE
Patient: Wily Bowers    Procedure: Procedure(s):  LEFT KNEE ARTHROSCOPY,  CHONDROPLASTY       Diagnosis: Osteoarthritis of left knee [M17.12]  Diagnosis Additional Information: No value filed.    Anesthesia Type:   General     Note:    Oropharynx: oropharynx clear of all foreign objects and spontaneously breathing  Level of Consciousness: awake  Oxygen Supplementation: face mask  Level of Supplemental Oxygen (L/min / FiO2): 8  Independent Airway: airway patency satisfactory and stable  Dentition: dentition unchanged  Vital Signs Stable: post-procedure vital signs reviewed and stable  Report to RN Given: handoff report given  Patient transferred to: PACU    Handoff Report: Identifed the Patient, Identified the Reponsible Provider, Reviewed the pertinent medical history, Discussed the surgical course, Reviewed Intra-OP anesthesia mangement and issues during anesthesia, Set expectations for post-procedure period and Allowed opportunity for questions and acknowledgement of understanding      Vitals:  Vitals Value Taken Time   /64 04/04/24 1317   Temp     Pulse 90 04/04/24 1317   Resp 16 04/04/24 1317   SpO2 92 % 04/04/24 1317       Electronically Signed By: KUSH Carter CRNA  April 4, 2024  1:18 PM

## 2024-04-07 LAB — GLUCOSE BLDC GLUCOMTR-MCNC: 118 MG/DL (ref 70–99)

## 2024-10-03 ENCOUNTER — LAB REQUISITION (OUTPATIENT)
Dept: LAB | Facility: CLINIC | Age: 49
End: 2024-10-03
Payer: COMMERCIAL

## 2024-10-03 DIAGNOSIS — R22.32 LOCALIZED SWELLING, MASS AND LUMP, LEFT UPPER LIMB: ICD-10-CM

## 2024-10-03 PROCEDURE — 88304 TISSUE EXAM BY PATHOLOGIST: CPT | Mod: TC,ORL

## 2024-10-03 PROCEDURE — 88304 TISSUE EXAM BY PATHOLOGIST: CPT | Mod: 26 | Performed by: PATHOLOGY

## 2024-10-07 LAB
PATH REPORT.COMMENTS IMP SPEC: NORMAL
PATH REPORT.COMMENTS IMP SPEC: NORMAL
PATH REPORT.FINAL DX SPEC: NORMAL
PATH REPORT.GROSS SPEC: NORMAL
PATH REPORT.MICROSCOPIC SPEC OTHER STN: NORMAL
PATH REPORT.RELEVANT HX SPEC: NORMAL
PHOTO IMAGE: NORMAL

## 2024-11-17 ENCOUNTER — HEALTH MAINTENANCE LETTER (OUTPATIENT)
Age: 49
End: 2024-11-17

## (undated) DEVICE — BANDAGE ELASTIC 6X550 LF DBL 593-96LF

## (undated) DEVICE — SOLUTION IRRIG 2B7127 .9NS 3000ML BAG

## (undated) DEVICE — DRAPE SHEET REV FOLD 3/4 9349

## (undated) DEVICE — TUBING SYSTEM ARTHREX PATIENT REDEUCE AR-6421

## (undated) DEVICE — MAT FLOOR SURGICAL 40X38 0702140238

## (undated) DEVICE — SUCTION MANIFOLD NEPTUNE 2 SYS 4 PORT 0702-020-000

## (undated) DEVICE — GOWN LG DISP 9515

## (undated) DEVICE — GLOVE UNDER INDICATOR PI SZ 6.5 LF 41665

## (undated) DEVICE — GLOVE BIOGEL PI INDICATOR 8.0 LF 41680

## (undated) DEVICE — GLOVE BIOGEL PI ULTRATOUCH G SZ 6.0 42160

## (undated) DEVICE — TUBING SUCTION MEDI-VAC 1/4"X20' N620A

## (undated) DEVICE — HOLDER LIMB VELCRO OR 0814-1533

## (undated) DEVICE — GLOVE BIOGEL PI ULTRATOUCH G SZ 8.0 42180

## (undated) DEVICE — BUR ARTHREX COOLCUT DBL CUT CVD 4.0MMX130MM AR-8400CDCS

## (undated) DEVICE — CUSTOM PACK ARTHROSCOPY KNEE SOP5BAKHEA

## (undated) DEVICE — PACK MINOR SINGLE BASIN SSK3001

## (undated) DEVICE — DRAPE STERI U 1015

## (undated) DEVICE — PREP CHLORAPREP 26ML TINTED HI-LITE ORANGE 930815

## (undated) DEVICE — SU ETHILON 3-0 PS-1 18" 1663G

## (undated) DEVICE — BLADE KNIFE SURG 11 371111

## (undated) DEVICE — CAST PADDING 6IN COTTON WEBRIL STERILE 2554

## (undated) DEVICE — SOL WATER IRRIG 1000ML BOTTLE 2F7114

## (undated) RX ORDER — LIDOCAINE HYDROCHLORIDE 10 MG/ML
INJECTION, SOLUTION EPIDURAL; INFILTRATION; INTRACAUDAL; PERINEURAL
Status: DISPENSED
Start: 2024-04-04

## (undated) RX ORDER — PROPOFOL 10 MG/ML
INJECTION, EMULSION INTRAVENOUS
Status: DISPENSED
Start: 2024-04-04

## (undated) RX ORDER — FENTANYL CITRATE 50 UG/ML
INJECTION, SOLUTION INTRAMUSCULAR; INTRAVENOUS
Status: DISPENSED
Start: 2024-04-04

## (undated) RX ORDER — ONDANSETRON 2 MG/ML
INJECTION INTRAMUSCULAR; INTRAVENOUS
Status: DISPENSED
Start: 2024-04-04

## (undated) RX ORDER — DEXAMETHASONE SODIUM PHOSPHATE 10 MG/ML
INJECTION, EMULSION INTRAMUSCULAR; INTRAVENOUS
Status: DISPENSED
Start: 2024-04-04